# Patient Record
Sex: MALE | Race: BLACK OR AFRICAN AMERICAN | NOT HISPANIC OR LATINO | URBAN - METROPOLITAN AREA
[De-identification: names, ages, dates, MRNs, and addresses within clinical notes are randomized per-mention and may not be internally consistent; named-entity substitution may affect disease eponyms.]

---

## 2019-09-14 ENCOUNTER — INPATIENT (INPATIENT)
Facility: HOSPITAL | Age: 54
LOS: 9 days | Discharge: HOME | End: 2019-09-24
Attending: PSYCHIATRY & NEUROLOGY | Admitting: PSYCHIATRY & NEUROLOGY
Payer: MEDICAID

## 2019-09-14 VITALS
DIASTOLIC BLOOD PRESSURE: 100 MMHG | SYSTOLIC BLOOD PRESSURE: 155 MMHG | HEART RATE: 67 BPM | RESPIRATION RATE: 18 BRPM | TEMPERATURE: 98 F

## 2019-09-14 DIAGNOSIS — F29 UNSPECIFIED PSYCHOSIS NOT DUE TO A SUBSTANCE OR KNOWN PHYSIOLOGICAL CONDITION: ICD-10-CM

## 2019-09-14 LAB
ALBUMIN SERPL ELPH-MCNC: 3.8 G/DL — SIGNIFICANT CHANGE UP (ref 3.3–5)
ALP SERPL-CCNC: 68 U/L — SIGNIFICANT CHANGE UP (ref 40–120)
ALT FLD-CCNC: 21 U/L — SIGNIFICANT CHANGE UP (ref 4–41)
ANION GAP SERPL CALC-SCNC: 14 MMO/L — SIGNIFICANT CHANGE UP (ref 7–14)
APAP SERPL-MCNC: < 15 UG/ML — LOW (ref 15–25)
AST SERPL-CCNC: 50 U/L — HIGH (ref 4–40)
BASOPHILS # BLD AUTO: 0.06 K/UL — SIGNIFICANT CHANGE UP (ref 0–0.2)
BASOPHILS NFR BLD AUTO: 0.8 % — SIGNIFICANT CHANGE UP (ref 0–2)
BILIRUB SERPL-MCNC: 0.3 MG/DL — SIGNIFICANT CHANGE UP (ref 0.2–1.2)
BUN SERPL-MCNC: 13 MG/DL — SIGNIFICANT CHANGE UP (ref 7–23)
CALCIUM SERPL-MCNC: 8.8 MG/DL — SIGNIFICANT CHANGE UP (ref 8.4–10.5)
CHLORIDE SERPL-SCNC: 103 MMOL/L — SIGNIFICANT CHANGE UP (ref 98–107)
CO2 SERPL-SCNC: 23 MMOL/L — SIGNIFICANT CHANGE UP (ref 22–31)
CREAT SERPL-MCNC: 0.85 MG/DL — SIGNIFICANT CHANGE UP (ref 0.5–1.3)
EOSINOPHIL # BLD AUTO: 0.17 K/UL — SIGNIFICANT CHANGE UP (ref 0–0.5)
EOSINOPHIL NFR BLD AUTO: 2.2 % — SIGNIFICANT CHANGE UP (ref 0–6)
ETHANOL BLD-MCNC: < 10 MG/DL — SIGNIFICANT CHANGE UP
GLUCOSE SERPL-MCNC: 65 MG/DL — LOW (ref 70–99)
HCT VFR BLD CALC: 37.5 % — LOW (ref 39–50)
HGB BLD-MCNC: 12.1 G/DL — LOW (ref 13–17)
IMM GRANULOCYTES NFR BLD AUTO: 0.3 % — SIGNIFICANT CHANGE UP (ref 0–1.5)
LYMPHOCYTES # BLD AUTO: 2.26 K/UL — SIGNIFICANT CHANGE UP (ref 1–3.3)
LYMPHOCYTES # BLD AUTO: 29.8 % — SIGNIFICANT CHANGE UP (ref 13–44)
MCHC RBC-ENTMCNC: 26.8 PG — LOW (ref 27–34)
MCHC RBC-ENTMCNC: 32.3 % — SIGNIFICANT CHANGE UP (ref 32–36)
MCV RBC AUTO: 83 FL — SIGNIFICANT CHANGE UP (ref 80–100)
MONOCYTES # BLD AUTO: 0.82 K/UL — SIGNIFICANT CHANGE UP (ref 0–0.9)
MONOCYTES NFR BLD AUTO: 10.8 % — SIGNIFICANT CHANGE UP (ref 2–14)
NEUTROPHILS # BLD AUTO: 4.25 K/UL — SIGNIFICANT CHANGE UP (ref 1.8–7.4)
NEUTROPHILS NFR BLD AUTO: 56.1 % — SIGNIFICANT CHANGE UP (ref 43–77)
NRBC # FLD: 0 K/UL — SIGNIFICANT CHANGE UP (ref 0–0)
PLATELET # BLD AUTO: 232 K/UL — SIGNIFICANT CHANGE UP (ref 150–400)
PMV BLD: 9.8 FL — SIGNIFICANT CHANGE UP (ref 7–13)
POTASSIUM SERPL-MCNC: 4.2 MMOL/L — SIGNIFICANT CHANGE UP (ref 3.5–5.3)
POTASSIUM SERPL-SCNC: 4.2 MMOL/L — SIGNIFICANT CHANGE UP (ref 3.5–5.3)
PROT SERPL-MCNC: 7.2 G/DL — SIGNIFICANT CHANGE UP (ref 6–8.3)
RBC # BLD: 4.52 M/UL — SIGNIFICANT CHANGE UP (ref 4.2–5.8)
RBC # FLD: 15.3 % — HIGH (ref 10.3–14.5)
SALICYLATES SERPL-MCNC: < 5 MG/DL — LOW (ref 15–30)
SODIUM SERPL-SCNC: 140 MMOL/L — SIGNIFICANT CHANGE UP (ref 135–145)
TSH SERPL-MCNC: 2.46 UIU/ML — SIGNIFICANT CHANGE UP (ref 0.27–4.2)
WBC # BLD: 7.58 K/UL — SIGNIFICANT CHANGE UP (ref 3.8–10.5)
WBC # FLD AUTO: 7.58 K/UL — SIGNIFICANT CHANGE UP (ref 3.8–10.5)

## 2019-09-14 RX ORDER — HALOPERIDOL DECANOATE 100 MG/ML
5 INJECTION INTRAMUSCULAR ONCE
Refills: 0 | Status: COMPLETED | OUTPATIENT
Start: 2019-09-14 | End: 2019-09-14

## 2019-09-14 RX ORDER — DIPHENHYDRAMINE HCL 50 MG
50 CAPSULE ORAL ONCE
Refills: 0 | Status: COMPLETED | OUTPATIENT
Start: 2019-09-14 | End: 2019-09-15

## 2019-09-14 RX ORDER — DIPHENHYDRAMINE HCL 50 MG
50 CAPSULE ORAL EVERY 6 HOURS
Refills: 0 | Status: DISCONTINUED | OUTPATIENT
Start: 2019-09-15 | End: 2019-09-24

## 2019-09-14 RX ORDER — DIPHENHYDRAMINE HCL 50 MG
50 CAPSULE ORAL ONCE
Refills: 0 | Status: COMPLETED | OUTPATIENT
Start: 2019-09-14 | End: 2019-09-14

## 2019-09-14 RX ORDER — IBUPROFEN 200 MG
400 TABLET ORAL EVERY 8 HOURS
Refills: 0 | Status: DISCONTINUED | OUTPATIENT
Start: 2019-09-15 | End: 2019-09-24

## 2019-09-14 RX ORDER — DIPHENHYDRAMINE HCL 50 MG
50 CAPSULE ORAL ONCE
Refills: 0 | Status: DISCONTINUED | OUTPATIENT
Start: 2019-09-15 | End: 2019-09-24

## 2019-09-14 RX ORDER — RISPERIDONE 4 MG/1
1 TABLET ORAL AT BEDTIME
Refills: 0 | Status: DISCONTINUED | OUTPATIENT
Start: 2019-09-15 | End: 2019-09-19

## 2019-09-14 RX ORDER — HALOPERIDOL DECANOATE 100 MG/ML
5 INJECTION INTRAMUSCULAR ONCE
Refills: 0 | Status: DISCONTINUED | OUTPATIENT
Start: 2019-09-15 | End: 2019-09-16

## 2019-09-14 RX ORDER — HALOPERIDOL DECANOATE 100 MG/ML
5 INJECTION INTRAMUSCULAR EVERY 6 HOURS
Refills: 0 | Status: DISCONTINUED | OUTPATIENT
Start: 2019-09-15 | End: 2019-09-24

## 2019-09-14 RX ADMIN — Medication 2 MILLIGRAM(S): at 15:45

## 2019-09-14 RX ADMIN — Medication 50 MILLIGRAM(S): at 15:45

## 2019-09-14 RX ADMIN — HALOPERIDOL DECANOATE 5 MILLIGRAM(S): 100 INJECTION INTRAMUSCULAR at 15:45

## 2019-09-14 NOTE — ED BEHAVIORAL HEALTH NOTE - BEHAVIORAL HEALTH NOTE
Patient presented with acute agitation and medicated with Haldol 5mg/ Ativan 2mg/ Benadryl 50mg and now sedated as a result.  Unable to participate in psychiatric evaluation at this time and will assess when able to.
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
## 2019-09-14 NOTE — ED BEHAVIORAL HEALTH NOTE - BEHAVIORAL HEALTH NOTE
Attempted to assess patient but remains sedated and not able to participate in psychiatric interview at this time.  [employer] search is completed and reveals no results.  Psychiatry will evaluate when able, please call with questions and/or concerns.
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
## 2019-09-14 NOTE — ED BEHAVIORAL HEALTH ASSESSMENT NOTE - SUICIDE PROTECTIVE FACTORS
Identifies reasons for living/Responsibility to family and others/High spirituality/Future oriented/Engaged in work or school

## 2019-09-14 NOTE — ED PROVIDER NOTE - CLINICAL SUMMARY MEDICAL DECISION MAKING FREE TEXT BOX
53 year old male with no known past medical history presents to ED with paranoia and agitation.  Medications offered to patient for safety to staff and patient.  Labs, EKG, psych consult with Dr. Duque when patient no longer agitated.
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
## 2019-09-14 NOTE — ED BEHAVIORAL HEALTH ASSESSMENT NOTE - SUMMARY
Mr. Beckford is a 52 y/o man with no known previous history/records, denying all active symptoms. Pt presents with paranoid delusions, some grandiosity/hyperreligiosity and intermittent disorganization of thought with tangential thought process. Unable to obtain collateral, but given patient's active paranoid ideation with recent agitation, pt requires hospitalization at this time. He is suffering from unspecified psychotic disorder, r/o substance induced psychosis vs schizophrenia.    Admit to Low 4 on 9.39 status. Pt denying intent to harm himself or others at this time, no CO necessary  Initiate risperidone 1 mg qHS for active psychosis  Benadryl 50 mg/haldol 5 mg/ativan 2 mg PO/IM q6h prn for agitation  Ibuprofen prn for pain    No known medical history, but pt has mild anemia, isolated elevated AST and was moderately hypertensive in the ED. Patient may benefit from iron studies, close monitoring of BP with initiation of antihypertensive agent if BP remains elevated.
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
## 2019-09-14 NOTE — ED BEHAVIORAL HEALTH ASSESSMENT NOTE - OTHER
pt remained seated psychotic episode 911 activated by bystander psychosis ; paranoia unknown . patient is uncooperative, tangential, psychotic Not in treatment

## 2019-09-14 NOTE — ED BEHAVIORAL HEALTH ASSESSMENT NOTE - MODIFICATIONS
Pt has a tender spot in the groin area. States that it is the size of a golf ball. This has been going on, on and off for about a week. Advised the pt to be seen. Appointment has been made in Family Practice.    see bellow Pt is seen and evaluated
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
## 2019-09-15 PROCEDURE — 99222 1ST HOSP IP/OBS MODERATE 55: CPT

## 2019-09-15 RX ORDER — DIPHENHYDRAMINE HCL 50 MG
50 CAPSULE ORAL ONCE
Refills: 0 | Status: DISCONTINUED | OUTPATIENT
Start: 2019-09-15 | End: 2019-09-24

## 2019-09-15 RX ORDER — HALOPERIDOL DECANOATE 100 MG/ML
5 INJECTION INTRAMUSCULAR ONCE
Refills: 0 | Status: DISCONTINUED | OUTPATIENT
Start: 2019-09-15 | End: 2019-09-24

## 2019-09-15 RX ADMIN — Medication 2 MILLIGRAM(S): at 00:08

## 2019-09-15 RX ADMIN — Medication 50 MILLIGRAM(S): at 00:08

## 2019-09-15 RX ADMIN — HALOPERIDOL DECANOATE 5 MILLIGRAM(S): 100 INJECTION INTRAMUSCULAR at 00:08

## 2019-09-15 NOTE — ED ADULT NURSE REASSESSMENT NOTE - NS ED NURSE REASSESS COMMENT FT1
Pt is agitated and threatening aggression towards staff.  Pt is disorganized and confused.  Pt medicated per EMAR.  Will continue to monitor for safety and therapeutic effect.

## 2019-09-15 NOTE — ED ADULT NURSE NOTE - NSIMPLEMENTINTERV_GEN_ALL_ED
Implemented All Universal Safety Interventions:  [place] to call system. Call bell, personal items and telephone within reach. Instruct patient to call for assistance. Room bathroom lighting operational. Non-slip footwear when patient is off stretcher. Physically safe environment: no spills, clutter or unnecessary equipment. Stretcher in lowest position, wheels locked, appropriate side rails in place.

## 2019-09-16 PROCEDURE — 99232 SBSQ HOSP IP/OBS MODERATE 35: CPT

## 2019-09-16 RX ORDER — CHLORPROMAZINE HCL 10 MG
100 TABLET ORAL ONCE
Refills: 0 | Status: DISCONTINUED | OUTPATIENT
Start: 2019-09-16 | End: 2019-09-24

## 2019-09-16 RX ORDER — DIPHENHYDRAMINE HCL 50 MG
50 CAPSULE ORAL ONCE
Refills: 0 | Status: DISCONTINUED | OUTPATIENT
Start: 2019-09-16 | End: 2019-09-24

## 2019-09-17 PROCEDURE — 99232 SBSQ HOSP IP/OBS MODERATE 35: CPT

## 2019-09-17 RX ADMIN — HALOPERIDOL DECANOATE 5 MILLIGRAM(S): 100 INJECTION INTRAMUSCULAR at 20:12

## 2019-09-17 RX ADMIN — Medication 2 MILLIGRAM(S): at 20:12

## 2019-09-17 RX ADMIN — RISPERIDONE 1 MILLIGRAM(S): 4 TABLET ORAL at 20:11

## 2019-09-17 RX ADMIN — Medication 50 MILLIGRAM(S): at 20:12

## 2019-09-18 PROCEDURE — 99231 SBSQ HOSP IP/OBS SF/LOW 25: CPT

## 2019-09-18 RX ADMIN — Medication 2 MILLIGRAM(S): at 21:03

## 2019-09-18 RX ADMIN — HALOPERIDOL DECANOATE 5 MILLIGRAM(S): 100 INJECTION INTRAMUSCULAR at 21:03

## 2019-09-18 RX ADMIN — Medication 50 MILLIGRAM(S): at 21:03

## 2019-09-18 RX ADMIN — RISPERIDONE 1 MILLIGRAM(S): 4 TABLET ORAL at 21:02

## 2019-09-19 PROCEDURE — 99232 SBSQ HOSP IP/OBS MODERATE 35: CPT

## 2019-09-19 RX ORDER — RISPERIDONE 4 MG/1
2 TABLET ORAL AT BEDTIME
Refills: 0 | Status: DISCONTINUED | OUTPATIENT
Start: 2019-09-19 | End: 2019-09-24

## 2019-09-19 RX ADMIN — RISPERIDONE 2 MILLIGRAM(S): 4 TABLET ORAL at 20:06

## 2019-09-19 RX ADMIN — HALOPERIDOL DECANOATE 5 MILLIGRAM(S): 100 INJECTION INTRAMUSCULAR at 20:06

## 2019-09-19 RX ADMIN — Medication 400 MILLIGRAM(S): at 18:27

## 2019-09-19 RX ADMIN — Medication 2 MILLIGRAM(S): at 20:06

## 2019-09-19 RX ADMIN — Medication 50 MILLIGRAM(S): at 20:07

## 2019-09-20 PROCEDURE — 99232 SBSQ HOSP IP/OBS MODERATE 35: CPT

## 2019-09-20 RX ADMIN — RISPERIDONE 2 MILLIGRAM(S): 4 TABLET ORAL at 20:08

## 2019-09-20 RX ADMIN — Medication 400 MILLIGRAM(S): at 20:28

## 2019-09-20 RX ADMIN — Medication 50 MILLIGRAM(S): at 19:26

## 2019-09-20 RX ADMIN — Medication 400 MILLIGRAM(S): at 19:27

## 2019-09-20 RX ADMIN — Medication 2 MILLIGRAM(S): at 19:27

## 2019-09-20 RX ADMIN — HALOPERIDOL DECANOATE 5 MILLIGRAM(S): 100 INJECTION INTRAMUSCULAR at 19:26

## 2019-09-21 RX ADMIN — Medication 2 MILLIGRAM(S): at 20:06

## 2019-09-21 RX ADMIN — Medication 50 MILLIGRAM(S): at 20:05

## 2019-09-21 RX ADMIN — HALOPERIDOL DECANOATE 5 MILLIGRAM(S): 100 INJECTION INTRAMUSCULAR at 20:06

## 2019-09-21 RX ADMIN — RISPERIDONE 2 MILLIGRAM(S): 4 TABLET ORAL at 20:05

## 2019-09-22 RX ADMIN — Medication 50 MILLIGRAM(S): at 19:47

## 2019-09-22 RX ADMIN — Medication 400 MILLIGRAM(S): at 09:18

## 2019-09-22 RX ADMIN — Medication 400 MILLIGRAM(S): at 19:46

## 2019-09-22 RX ADMIN — Medication 2 MILLIGRAM(S): at 19:47

## 2019-09-22 RX ADMIN — HALOPERIDOL DECANOATE 5 MILLIGRAM(S): 100 INJECTION INTRAMUSCULAR at 19:47

## 2019-09-22 RX ADMIN — RISPERIDONE 2 MILLIGRAM(S): 4 TABLET ORAL at 20:15

## 2019-09-22 RX ADMIN — Medication 400 MILLIGRAM(S): at 21:52

## 2019-09-23 PROCEDURE — 99231 SBSQ HOSP IP/OBS SF/LOW 25: CPT

## 2019-09-23 RX ORDER — RISPERIDONE 4 MG/1
1 TABLET ORAL
Qty: 14 | Refills: 0
Start: 2019-09-23 | End: 2019-10-06

## 2019-09-23 RX ADMIN — Medication 400 MILLIGRAM(S): at 19:40

## 2019-09-23 RX ADMIN — Medication 400 MILLIGRAM(S): at 20:35

## 2019-09-23 RX ADMIN — Medication 1 MILLIGRAM(S): at 19:40

## 2019-09-23 RX ADMIN — Medication 50 MILLIGRAM(S): at 19:40

## 2019-09-23 RX ADMIN — RISPERIDONE 2 MILLIGRAM(S): 4 TABLET ORAL at 22:14

## 2019-09-23 RX ADMIN — HALOPERIDOL DECANOATE 5 MILLIGRAM(S): 100 INJECTION INTRAMUSCULAR at 19:40

## 2019-09-23 RX ADMIN — Medication 400 MILLIGRAM(S): at 11:57

## 2019-09-24 VITALS — TEMPERATURE: 98 F

## 2019-09-24 PROCEDURE — 99238 HOSP IP/OBS DSCHRG MGMT 30/<: CPT

## 2019-09-24 RX ADMIN — Medication 400 MILLIGRAM(S): at 08:24

## 2019-10-01 ENCOUNTER — OUTPATIENT (OUTPATIENT)
Dept: OUTPATIENT SERVICES | Facility: HOSPITAL | Age: 54
LOS: 1 days | End: 2019-10-01
Payer: MEDICAID

## 2019-10-01 PROCEDURE — G9001: CPT

## 2019-10-10 DIAGNOSIS — Z71.89 OTHER SPECIFIED COUNSELING: ICD-10-CM

## 2019-11-01 ENCOUNTER — OUTPATIENT (OUTPATIENT)
Dept: OUTPATIENT SERVICES | Facility: HOSPITAL | Age: 54
LOS: 1 days | End: 2019-11-01

## 2019-12-04 DIAGNOSIS — Z71.89 OTHER SPECIFIED COUNSELING: ICD-10-CM
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
## 2022-10-29 ENCOUNTER — EMERGENCY (EMERGENCY)
Facility: HOSPITAL | Age: 57
LOS: 1 days | Discharge: PSYCHIATRIC OR SUBSTANCE USE FACILITY | End: 2022-10-29
Attending: EMERGENCY MEDICINE
Payer: MEDICAID

## 2022-10-29 VITALS
OXYGEN SATURATION: 99 % | SYSTOLIC BLOOD PRESSURE: 129 MMHG | HEART RATE: 70 BPM | TEMPERATURE: 98 F | DIASTOLIC BLOOD PRESSURE: 70 MMHG | RESPIRATION RATE: 18 BRPM

## 2022-10-29 DIAGNOSIS — F29 UNSPECIFIED PSYCHOSIS NOT DUE TO A SUBSTANCE OR KNOWN PHYSIOLOGICAL CONDITION: ICD-10-CM

## 2022-10-29 LAB
ALBUMIN SERPL ELPH-MCNC: 3.7 G/DL — SIGNIFICANT CHANGE UP (ref 3.3–5)
ALP SERPL-CCNC: 82 U/L — SIGNIFICANT CHANGE UP (ref 40–120)
ALT FLD-CCNC: 17 U/L — SIGNIFICANT CHANGE UP (ref 10–45)
ANION GAP SERPL CALC-SCNC: 10 MMOL/L — SIGNIFICANT CHANGE UP (ref 5–17)
APAP SERPL-MCNC: <15 UG/ML — SIGNIFICANT CHANGE UP (ref 10–30)
AST SERPL-CCNC: 27 U/L — SIGNIFICANT CHANGE UP (ref 10–40)
BASOPHILS # BLD AUTO: 0.05 K/UL — SIGNIFICANT CHANGE UP (ref 0–0.2)
BASOPHILS NFR BLD AUTO: 0.8 % — SIGNIFICANT CHANGE UP (ref 0–2)
BILIRUB SERPL-MCNC: 0.1 MG/DL — LOW (ref 0.2–1.2)
BUN SERPL-MCNC: 12 MG/DL — SIGNIFICANT CHANGE UP (ref 7–23)
CALCIUM SERPL-MCNC: 8.7 MG/DL — SIGNIFICANT CHANGE UP (ref 8.4–10.5)
CHLORIDE SERPL-SCNC: 108 MMOL/L — SIGNIFICANT CHANGE UP (ref 96–108)
CO2 SERPL-SCNC: 24 MMOL/L — SIGNIFICANT CHANGE UP (ref 22–31)
CREAT SERPL-MCNC: 0.68 MG/DL — SIGNIFICANT CHANGE UP (ref 0.5–1.3)
EGFR: 110 ML/MIN/1.73M2 — SIGNIFICANT CHANGE UP
EOSINOPHIL # BLD AUTO: 0.17 K/UL — SIGNIFICANT CHANGE UP (ref 0–0.5)
EOSINOPHIL NFR BLD AUTO: 2.9 % — SIGNIFICANT CHANGE UP (ref 0–6)
ETHANOL SERPL-MCNC: <10 MG/DL — SIGNIFICANT CHANGE UP (ref 0–10)
GLUCOSE SERPL-MCNC: 108 MG/DL — HIGH (ref 70–99)
HCT VFR BLD CALC: 34.4 % — LOW (ref 39–50)
HGB BLD-MCNC: 11.4 G/DL — LOW (ref 13–17)
IMM GRANULOCYTES NFR BLD AUTO: 0.3 % — SIGNIFICANT CHANGE UP (ref 0–0.9)
LYMPHOCYTES # BLD AUTO: 1.26 K/UL — SIGNIFICANT CHANGE UP (ref 1–3.3)
LYMPHOCYTES # BLD AUTO: 21.1 % — SIGNIFICANT CHANGE UP (ref 13–44)
MCHC RBC-ENTMCNC: 27.8 PG — SIGNIFICANT CHANGE UP (ref 27–34)
MCHC RBC-ENTMCNC: 33.1 GM/DL — SIGNIFICANT CHANGE UP (ref 32–36)
MCV RBC AUTO: 83.9 FL — SIGNIFICANT CHANGE UP (ref 80–100)
MONOCYTES # BLD AUTO: 0.63 K/UL — SIGNIFICANT CHANGE UP (ref 0–0.9)
MONOCYTES NFR BLD AUTO: 10.6 % — SIGNIFICANT CHANGE UP (ref 2–14)
NEUTROPHILS # BLD AUTO: 3.83 K/UL — SIGNIFICANT CHANGE UP (ref 1.8–7.4)
NEUTROPHILS NFR BLD AUTO: 64.3 % — SIGNIFICANT CHANGE UP (ref 43–77)
NRBC # BLD: 0 /100 WBCS — SIGNIFICANT CHANGE UP (ref 0–0)
PLATELET # BLD AUTO: 253 K/UL — SIGNIFICANT CHANGE UP (ref 150–400)
POTASSIUM SERPL-MCNC: 3.5 MMOL/L — SIGNIFICANT CHANGE UP (ref 3.5–5.3)
POTASSIUM SERPL-SCNC: 3.5 MMOL/L — SIGNIFICANT CHANGE UP (ref 3.5–5.3)
PROT SERPL-MCNC: 6.7 G/DL — SIGNIFICANT CHANGE UP (ref 6–8.3)
RBC # BLD: 4.1 M/UL — LOW (ref 4.2–5.8)
RBC # FLD: 14.8 % — HIGH (ref 10.3–14.5)
SALICYLATES SERPL-MCNC: <2 MG/DL — LOW (ref 15–30)
SARS-COV-2 RNA SPEC QL NAA+PROBE: DETECTED
SODIUM SERPL-SCNC: 142 MMOL/L — SIGNIFICANT CHANGE UP (ref 135–145)
WBC # BLD: 5.96 K/UL — SIGNIFICANT CHANGE UP (ref 3.8–10.5)
WBC # FLD AUTO: 5.96 K/UL — SIGNIFICANT CHANGE UP (ref 3.8–10.5)

## 2022-10-29 PROCEDURE — 90792 PSYCH DIAG EVAL W/MED SRVCS: CPT | Mod: 95

## 2022-10-29 PROCEDURE — 70450 CT HEAD/BRAIN W/O DYE: CPT | Mod: 26,MA

## 2022-10-29 PROCEDURE — 99285 EMERGENCY DEPT VISIT HI MDM: CPT

## 2022-10-29 RX ORDER — HALOPERIDOL DECANOATE 100 MG/ML
5 INJECTION INTRAMUSCULAR ONCE
Refills: 0 | Status: COMPLETED | OUTPATIENT
Start: 2022-10-29 | End: 2022-10-29

## 2022-10-29 RX ADMIN — Medication 2 MILLIGRAM(S): at 16:09

## 2022-10-29 RX ADMIN — HALOPERIDOL DECANOATE 5 MILLIGRAM(S): 100 INJECTION INTRAMUSCULAR at 16:10

## 2022-10-29 NOTE — ED BEHAVIORAL HEALTH ASSESSMENT NOTE - RISK ASSESSMENT
06-Oct-2018 02:50 Patient is at higher risk fo harming self and others due to disorganized behavior, violent and aggressive behavior, inability to engage in clinical interview or safety plan. Risk is mitigated by presence in a safe environment with access to clinical interventions. Unable to determine Suicide Risk
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
## 2022-10-29 NOTE — ED PROVIDER NOTE - CARE PLAN
1 Principal Discharge DX:	Psychosis, unspecified psychosis type  Secondary Diagnosis:	Psychosis, unspecified psychosis type
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
## 2022-10-29 NOTE — ED BEHAVIORAL HEALTH ASSESSMENT NOTE - SUBSTANCE ISSUES AND PLAN (INCLUDE STANDING AND PRN MEDICATION)
Unknown substance use history, recommend [facility] protocol; attempt to collect urine tox
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
## 2022-10-29 NOTE — ED PROVIDER NOTE - ATTENDING CONTRIBUTION TO CARE
Attending MD Netta Garrido:  I personally have seen and examined this patient.  Resident note reviewed and agree on plan of care and except where noted.  See HPI, PE, and MDM for details.

## 2022-10-29 NOTE — ED BEHAVIORAL HEALTH ASSESSMENT NOTE - OTHER
[facility], [facility] reportedly undomiciled unable to assess police/EMS

## 2022-10-29 NOTE — ED ADULT NURSE NOTE - ED STAT RN HANDOFF DETAILS 4
Blood work is improving, heart failure numbers are improving, please inform patient. She should follow up with the cardiologist and Dr Missy Parsons as recommended.  Thanks
Pt lab results are in. Pls review abnormal levels. LOV. 12/31/19  NOV 1/27/20  pls advise on results. Routing to Dr. Obie Hogan, pls advise. ;
Relayed info to pt as stated below. Pt verbalized understanding.
report received from Crista CHUNG
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
## 2022-10-29 NOTE — ED PROVIDER NOTE - PHYSICAL EXAMINATION
Gabrielle Wong MD  GENERAL: Patient asleep after meds, NAD. disheveled  HEENT: NC/AT, Moist mucous membranes, EOMI.  LUNGS: CTAB, no wheezes or crackles.   CARDIAC: RRR, no m/r/g.    ABDOMEN: Soft, NT, ND, No rebound, guarding. No CVA tenderness.   EXT: No edema. No calf tenderness  MSK: No pain with movement, no deformities.  NEURO: Moving all extremities.  SKIN: Warm and dry. No rash. Gabrielle Wong MD  GENERAL: Patient asleep after meds, NAD. disheveled  HEENT: NC/AT, Moist mucous membranes, EOMI.  LUNGS: CTAB, no wheezes or crackles.   CARDIAC: RRR, no m/r/g.    ABDOMEN: Soft, NT, ND, No rebound, guarding. No CVA tenderness.   EXT: No edema. No calf tenderness  MSK: No pain with movement, no deformities.  NEURO: Moving all extremities.  SKIN: Warm and dry. No rash.  Attending Netta Garrido: Gen: nad: heent: atrauamtic, mmm, neck: nttp full rom, cv:rrr. lungs ;ctab, abd; :soft, onntender, ext: wwp, neuro:awake anot following commands. refusing to communicate

## 2022-10-29 NOTE — ED PROVIDER NOTE - PROGRESS NOTE DETAILS
psych consulted psych consulted but will call back as currently busy pt arousable. not answering questions.  psych consulted Attending Netta Garrido: d/w tele psych unable to clear at this time. pt not cooperating with camera. will try to re eval Migue Stanley PGY-3 patient seen by psych in ed, too drowsy to answer questions, will call back when more awake Migue Stanley PGY-3 patient being uncooperative with psych, not answering questions. will 2PC Migue Stanley PGY-3 patient being uncooperative with psych, not answering questions. concern for psychosis, will 2PC Patient signed out to me by the prior attending.  The patient's disposition was discussed with the treating team and agreed upon.  Ronaldo Hinton M.D. (attending) patient pending am day psychiatry. Patient has spoken to the RN team Wale Trejo patient is stating that the emergency department team is "evil everyone here is evil and you work for evil and you're treating me as if I'm a dog." Attending Netta Garrido: pt endorsed to me awaiting psych placement. informedb by nurse pt rolled out of bed. awake and alert. moving all extreimties. pt denies any pain. not on blood thinners. will continue to monitor. Clint Sullivan, PGY-3- patient received at sign out. Pending psych placement. Pt to be transferred to [facility]

## 2022-10-30 LAB
APPEARANCE UR: CLEAR — SIGNIFICANT CHANGE UP
BILIRUB UR-MCNC: NEGATIVE — SIGNIFICANT CHANGE UP
COLOR SPEC: SIGNIFICANT CHANGE UP
DIFF PNL FLD: NEGATIVE — SIGNIFICANT CHANGE UP
GLUCOSE UR QL: NEGATIVE — SIGNIFICANT CHANGE UP
KETONES UR-MCNC: NEGATIVE — SIGNIFICANT CHANGE UP
LEUKOCYTE ESTERASE UR-ACNC: NEGATIVE — SIGNIFICANT CHANGE UP
NITRITE UR-MCNC: NEGATIVE — SIGNIFICANT CHANGE UP
PH UR: 7 — SIGNIFICANT CHANGE UP (ref 5–8)
PROT UR-MCNC: SIGNIFICANT CHANGE UP
SP GR SPEC: 1.02 — SIGNIFICANT CHANGE UP (ref 1.01–1.02)
UROBILINOGEN FLD QL: NEGATIVE — SIGNIFICANT CHANGE UP

## 2022-10-30 RX ORDER — OLANZAPINE 15 MG/1
2.5 TABLET, FILM COATED ORAL ONCE
Refills: 0 | Status: COMPLETED | OUTPATIENT
Start: 2022-10-30 | End: 2022-10-30

## 2022-10-30 NOTE — PROGRESS NOTE BEHAVIORAL HEALTH - NSBHCONSULTRECOMMENDOTHER_PSY_A_CORE FT
1) consider initiating zyprexa 2.5 mg po BID for psychosis/mood stability  2) continue haldol 5 mg and ativan 2 mg IV/IM Q6hr PRN for acute agitation, f/u QTc < 500  3) 2PC paperwork in chart, SW aware to facilitate psychiatric transfer
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
## 2022-10-30 NOTE — PROGRESS NOTE BEHAVIORAL HEALTH - NSBHCHARTREVIEWVS_PSY_A_CORE FT
T(C): 36.5 (10-30-22 @ 07:15), Max: 36.6 (10-29-22 @ 19:11)  HR: 85 (10-30-22 @ 07:15) (69 - 85)  BP: 136/76 (10-30-22 @ 07:15) (118/75 - 136/76)  RR: 17 (10-30-22 @ 07:15) (17 - 18)  SpO2: 99% (10-30-22 @ 07:15) (99% - 100%)  Wt(kg): --

## 2022-10-30 NOTE — ED ADULT NURSE REASSESSMENT NOTE - NS ED NURSE REASSESS COMMENT FT1
psych at bedside for eval. psych at bedside for eval. pt asleep, snoring, not waking up for eval. psych asked to be paged once pt wakes up. MD Hinton aware.
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
## 2022-10-30 NOTE — ED ADULT NURSE REASSESSMENT NOTE - NS ED NURSE REASSESS COMMENT FT1
RN spoke with social work regarding bed placement for pt. as per social work no covid positive psych beds are available at this time. Rn attempted to get vitals on pt. pt became aggressive and grabbed BP cuff out of RN's hands and waved hand in RN's face. PT then preceded to throw sandwich and juices proved by RN across the room. EM MD team notified.

## 2022-10-30 NOTE — ED ADULT NURSE REASSESSMENT NOTE - NS ED NURSE REASSESS COMMENT FT1
pt woken easily. pt became immediately agitated and began yelling at RN. pt consented to having vital signes taken. pt given food as requested and went to sleep after vitals were taken. received report from JULIET Baltazar. pt woken easily. pt became immediately agitated and began yelling at RN. pt consented to having vital signes taken. vital signs stable. pt given food as requested and went to sleep after vitals were taken. pt awaiting psych consult.

## 2022-10-30 NOTE — PROGRESS NOTE BEHAVIORAL HEALTH - NSBHFUPINTERVALHXFT_PSY_A_CORE
pt seen, with blanket over head, then when prompted to engage, pt quickly jumped up, grabbed his food and drink and began eating. pt appeared agitated, refusing to answer questions, was not talking. pt when asked if he had thoughts to hurting himself, he shook his head. when asked if he had thoughts of hurting others, he nodded his head, but would not verbalize his thoughts. Staff did indicate pt was verbally agitated when he came to the ER. pt remains on 1:1, is danger to self and others based on history that brought pt into the ER.

## 2022-10-30 NOTE — PROGRESS NOTE BEHAVIORAL HEALTH - NSBHCHARTREVIEWLAB_PSY_A_CORE FT
11.4   5.96  )-----------( 253      ( 29 Oct 2022 16:48 )             34.4   10-29    142  |  108  |  12  ----------------------------<  108<H>  3.5   |  24  |  0.68    Ca    8.7      29 Oct 2022 16:48    TPro  6.7  /  Alb  3.7  /  TBili  0.1<L>  /  DBili  x   /  AST  27  /  ALT  17  /  AlkPhos  82  10-29

## 2022-10-30 NOTE — ED ADULT NURSE REASSESSMENT NOTE - NS ED NURSE REASSESS COMMENT FT1
Report received from JULIET Echevarria.  As per RN pt refusing care, getting upset when trying to get VS. MD aware of the situation. Pt sleeping at this time. Constant observation maintained, environment check for safety.

## 2022-10-30 NOTE — ED ADULT NURSE REASSESSMENT NOTE - NS ED NURSE REASSESS COMMENT FT1
as per MD, zyprexa should be given if pt becomes agitated and does not need to be given while pt is sleeping.
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
## 2022-10-31 ENCOUNTER — INPATIENT (INPATIENT)
Facility: HOSPITAL | Age: 57
LOS: 2 days | Discharge: HOME | End: 2022-11-03
Attending: PSYCHIATRY & NEUROLOGY | Admitting: PSYCHIATRY & NEUROLOGY

## 2022-10-31 VITALS
OXYGEN SATURATION: 100 % | SYSTOLIC BLOOD PRESSURE: 111 MMHG | TEMPERATURE: 98 F | HEART RATE: 82 BPM | DIASTOLIC BLOOD PRESSURE: 57 MMHG | RESPIRATION RATE: 16 BRPM

## 2022-10-31 VITALS — HEIGHT: 76 IN | TEMPERATURE: 97 F | WEIGHT: 166.89 LBS

## 2022-10-31 DIAGNOSIS — F10.10 ALCOHOL ABUSE, UNCOMPLICATED: ICD-10-CM

## 2022-10-31 DIAGNOSIS — Z59.00 HOMELESSNESS UNSPECIFIED: ICD-10-CM

## 2022-10-31 DIAGNOSIS — F14.10 COCAINE ABUSE, UNCOMPLICATED: ICD-10-CM

## 2022-10-31 DIAGNOSIS — F29 UNSPECIFIED PSYCHOSIS NOT DUE TO A SUBSTANCE OR KNOWN PHYSIOLOGICAL CONDITION: ICD-10-CM

## 2022-10-31 DIAGNOSIS — F60.2 ANTISOCIAL PERSONALITY DISORDER: ICD-10-CM

## 2022-10-31 PROCEDURE — 99285 EMERGENCY DEPT VISIT HI MDM: CPT | Mod: 25

## 2022-10-31 PROCEDURE — 93005 ELECTROCARDIOGRAM TRACING: CPT

## 2022-10-31 PROCEDURE — 81003 URINALYSIS AUTO W/O SCOPE: CPT

## 2022-10-31 PROCEDURE — 36415 COLL VENOUS BLD VENIPUNCTURE: CPT

## 2022-10-31 PROCEDURE — 70450 CT HEAD/BRAIN W/O DYE: CPT | Mod: MA

## 2022-10-31 PROCEDURE — 85025 COMPLETE CBC W/AUTO DIFF WBC: CPT

## 2022-10-31 PROCEDURE — 87635 SARS-COV-2 COVID-19 AMP PRB: CPT

## 2022-10-31 PROCEDURE — 80307 DRUG TEST PRSMV CHEM ANLYZR: CPT

## 2022-10-31 PROCEDURE — 96372 THER/PROPH/DIAG INJ SC/IM: CPT

## 2022-10-31 PROCEDURE — 99222 1ST HOSP IP/OBS MODERATE 55: CPT | Mod: GC

## 2022-10-31 PROCEDURE — 80053 COMPREHEN METABOLIC PANEL: CPT

## 2022-10-31 RX ORDER — LANOLIN ALCOHOL/MO/W.PET/CERES
5 CREAM (GRAM) TOPICAL AT BEDTIME
Refills: 0 | Status: DISCONTINUED | OUTPATIENT
Start: 2022-10-31 | End: 2022-11-03

## 2022-10-31 RX ORDER — HALOPERIDOL DECANOATE 100 MG/ML
5 INJECTION INTRAMUSCULAR EVERY 4 HOURS
Refills: 0 | Status: DISCONTINUED | OUTPATIENT
Start: 2022-10-31 | End: 2022-11-03

## 2022-10-31 RX ORDER — HALOPERIDOL DECANOATE 100 MG/ML
5 INJECTION INTRAMUSCULAR EVERY 4 HOURS
Refills: 0 | Status: DISCONTINUED | OUTPATIENT
Start: 2022-10-31 | End: 2022-10-31

## 2022-10-31 RX ORDER — DIPHENHYDRAMINE HCL 50 MG
50 CAPSULE ORAL EVERY 4 HOURS
Refills: 0 | Status: DISCONTINUED | OUTPATIENT
Start: 2022-10-31 | End: 2022-11-03

## 2022-10-31 RX ORDER — HALOPERIDOL DECANOATE 100 MG/ML
7.5 INJECTION INTRAMUSCULAR ONCE
Refills: 0 | Status: DISCONTINUED | OUTPATIENT
Start: 2022-10-31 | End: 2022-11-03

## 2022-10-31 RX ORDER — OLANZAPINE 15 MG/1
10 TABLET, FILM COATED ORAL AT BEDTIME
Refills: 0 | Status: DISCONTINUED | OUTPATIENT
Start: 2022-10-31 | End: 2022-11-01

## 2022-10-31 RX ORDER — DIPHENHYDRAMINE HCL 50 MG
25 CAPSULE ORAL ONCE
Refills: 0 | Status: DISCONTINUED | OUTPATIENT
Start: 2022-10-31 | End: 2022-11-03

## 2022-10-31 RX ADMIN — OLANZAPINE 2.5 MILLIGRAM(S): 15 TABLET, FILM COATED ORAL at 09:09

## 2022-10-31 SDOH — ECONOMIC STABILITY - HOUSING INSECURITY: HOMELESSNESS UNSPECIFIED: Z59.00
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
## 2022-10-31 NOTE — BH INPATIENT PSYCHIATRY ASSESSMENT NOTE - CURRENT MEDICATION
MEDICATIONS  (STANDING):    MEDICATIONS  (PRN):   MEDICATIONS  (STANDING):  multivitamin 1 Tablet(s) Oral daily  OLANZapine Disintegrating Tablet 10 milliGRAM(s) Oral at bedtime    MEDICATIONS  (PRN):  diphenhydrAMINE 50 milliGRAM(s) Oral every 4 hours PRN agitation  diphenhydrAMINE Injectable 25 milliGRAM(s) IntraMuscular once PRN severe agitation  haloperidol     Tablet 5 milliGRAM(s) Oral every 4 hours PRN agitation  haloperidol    Injectable 7.5 milliGRAM(s) IntraMuscular once PRN severe agitation  LORazepam     Tablet 2 milliGRAM(s) Oral every 4 hours PRN agitation  LORazepam   Injectable 3 milliGRAM(s) IntraMuscular once PRN severe agitation  melatonin. 5 milliGRAM(s) Oral at bedtime PRN Insomnia

## 2022-10-31 NOTE — BH INPATIENT PSYCHIATRY ASSESSMENT NOTE - MSE UNSTRUCTURED FT
The patient appears older than stated age, poor hygiene and dressed inappropriately wrapped in Caodaism fashion in hospital gowns.  The patient was labile, easily irritable, cooperative with the interview and poor eye contact. Oddly related.  No psychomotor agitation or retardation noted, no abnormal movements.  Steady gait observed.  The patient's speech was fluent, normal in tone, rate, and loud volume.  The patient's mood is "good."  Affect is irritable, labile and not appropriate.  Thought process is mainly linear but can be disorganized at times, tangential. Thought content notable for paranoid delusional thought of persecution. No SI, HI. Unable to fully assess for hallucinations. Cognition grossly intact. Insight is poor.  Judgment is poor.  Impulse control has been fair on the unit.

## 2022-10-31 NOTE — BH INPATIENT PSYCHIATRY ASSESSMENT NOTE - NSBHATTESTCOMMENTATTENDFT_PSY_A_CORE
Patient seen and chart reviewed  Initially very argumentative and uncooperative  Later able to describe the circumstances of his being BIB police after writing Munir at [employer]  Has been living on the streets and refuses Shelter  Uses cannabis and cocaine  Long hx of psychosis as per [facility]  Denies medical history  Denies allergies  Denies having Psychiatric illness  On initial MSE today the patient is dressed in hospital gowns and makes poor eye contact.   Speech is somewhat rapid and pressured  Thought process with significant disorder of thought process including thought disorganization.   Thought content with grandiose and paranoid delusions.   Patient requires acute inpatient care for treatment of psychosis.   Patient transferred from [facility] CL on a 927 involuntary status.    Patient does not require constant observation at this time and will follow with routine checks.   Patient started at [facility] on Olanzapine and will continue with 10 mg HS and will follow for response.

## 2022-10-31 NOTE — BH INPATIENT PSYCHIATRY ASSESSMENT NOTE - NSBHCHARTREVIEWVS_PSY_A_CORE FT
Vital Signs Last 24 Hrs  T(C): 36.1 (10-31-22 @ 16:43), Max: 36.1 (10-31-22 @ 16:43)  T(F): 97 (10-31-22 @ 16:43), Max: 97 (10-31-22 @ 16:43)  HR: --  BP: --  BP(mean): --  RR: --  SpO2: --    Orthostatic VS  10-31-22 @ 16:43  Lying BP: --/-- HR: --  Sitting BP: 125/84 HR: 80  Standing BP: 120/80 HR: 80  Site: --  Mode: --

## 2022-10-31 NOTE — ED BEHAVIORAL HEALTH NOTE - BEHAVIORAL HEALTH NOTE
=============  Re-assessment Note  =============  SOURCE:  RN and [employer] ED documentation.  BEHAVIOR: RN stated that the patient was originally agitated, stated patient was aggressive towards staff, though was able to respond to verbal re-direction without requiring PRN medication. RN stated patient was able to eat dinner prior to going to sleep, denies patient is actively expressing SI/HI/AVH.   TREATMENT:  Per chart, patient did not require PRN medications.   VISITORS: None

## 2022-10-31 NOTE — ED ADULT NURSE REASSESSMENT NOTE - NS ED NURSE REASSESS COMMENT FT1
Pt awake, calm, cooperative. Pt appears comfortable. Pt denies any needs at this time. Pt placed in position of comfort. Pt was provided with  food and beverage. Constant observation remained. Bed in lowest position, wheels locked, appropriate side rails raised.

## 2022-10-31 NOTE — ED ADULT NURSE REASSESSMENT NOTE - NS ED NURSE REASSESS COMMENT FT1
Patient remains stable, awake and alert, calm and cooperative, still awaiting Psyche placement. Patient is in Isolation for Covid. Patient re wanded by security. VS WDL. Continued on 1:1 observation for safety.

## 2022-10-31 NOTE — BH INPATIENT PSYCHIATRY ASSESSMENT NOTE - RISK ASSESSMENT
risk factors: male, active psychosis, unhoused, non adherent to treatment, lack of support system  protective factors: denies HI, SI.   At this time, pt with low risk of harm to self or others. No elevated risk of suicide.
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
## 2022-10-31 NOTE — ED ADULT NURSE REASSESSMENT NOTE - NS ED NURSE REASSESS COMMENT FT1
Patient received from JULIET miller, patient resting/sleeping but easily arousable, been here for more than a day for aggressive behavior, on isolation and awaiting Psyche placement. Patient calm and coooperative, talking saying"I eat everything" when offered breakfast, and saying" God bless you brother". VS WDL. Meds given as ordered and tolerated well.

## 2022-10-31 NOTE — BH INPATIENT PSYCHIATRY ASSESSMENT NOTE - NSICDXBHSECONDARYDX_PSY_ALL_CORE
Cocaine abuse   F14.10  Alcohol abuse   F10.10  Homelessness   Z59.00  Antisocial personality disorder   F60.2

## 2022-11-01 PROCEDURE — 99232 SBSQ HOSP IP/OBS MODERATE 35: CPT | Mod: GC

## 2022-11-01 RX ORDER — RISPERIDONE 4 MG/1
2 TABLET ORAL AT BEDTIME
Refills: 0 | Status: DISCONTINUED | OUTPATIENT
Start: 2022-11-01 | End: 2022-11-03

## 2022-11-01 RX ORDER — IBUPROFEN 200 MG
600 TABLET ORAL ONCE
Refills: 0 | Status: COMPLETED | OUTPATIENT
Start: 2022-11-01 | End: 2022-11-01

## 2022-11-01 RX ADMIN — Medication 600 MILLIGRAM(S): at 22:35

## 2022-11-01 RX ADMIN — Medication 600 MILLIGRAM(S): at 21:35

## 2022-11-01 RX ADMIN — Medication 1 TABLET(S): at 08:54
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
## 2022-11-01 NOTE — PSYCHIATRIC REHAB INITIAL EVALUATION - NSBHPRRECOMMEND_PSY_ALL_CORE
Writer met with patient to orient patient to the unit and introduce psychiatric rehabilitation staff and functions. Throughout the session, patient communicated non-verbally (i.e. body language and writing down). Patient was noted to write “stop the crap. Be who you are. I love  Culture!” When queried about what he means by “stop the crap,” patient continued to write “job formalities and protocol.” Patient did not specify further. Patient was unable to engage in meaningful conversation as patient continued to make bodily gestures that writer could not decipher. Patient presented with poor insight to his symptoms. Patient denied SI, HI, AH, and VH as evidenced by patient shaking his head. Psychiatric rehabilitation goal will be assigned for patient and reassessed later if needed. Psychiatric rehabilitation team will engage with patient daily to provide therapeutic support throughout his inpatient stay.

## 2022-11-01 NOTE — BH INPATIENT PSYCHIATRY PROGRESS NOTE - NSBHFUPINTERVALHXFT_PSY_A_CORE
Chart reviewed. Case discussed with treatment team. Patient seen and examined. No acute overnight events, no PRNs required/requested. Refusing standing olanzapine.    Today, pt labile and easily irritable  No physical complaints. Endorses good appetite and sleep. Denies audtiory/visual hallucinations. Denies passive or active SI, intent, or plan.  Chart reviewed. Case discussed with treatment team. Patient seen and examined. No acute overnight events, no PRNs required/requested. Refusing standing olanzapine.    Today, pt labile and easily irritable, superficially cooperative with interview. Pt verbally abusive/aggressive towards treatment team. Increasingly thought disordered today. Interview limited by ability to present coherent narrative. Pt paranoid (prashant surrounding treatment mentioning family members), accusing team of looking at him suspiciously/having an attitude. Pt with spontaneous delusional content, refusing to take standing medication and vitamins stating "I have all the vitamins I need in my testicles. Unwilling to accept housing/shelter referrals. Pt requesting to speak with attending MD only. Unable to assess AVH, SI, HI.

## 2022-11-01 NOTE — PSYCHIATRIC REHAB INITIAL EVALUATION - NSBHALCSUBCHOICE_PSY_ALL_CORE
Per chart, patient has hx of using cannabis and crack cocaine. Also, chart indicates that patient has hx of opioid use disorder.

## 2022-11-01 NOTE — BH INPATIENT PSYCHIATRY PROGRESS NOTE - MSE UNSTRUCTURED FT
The patient appears older than stated age, poor hygiene and dressed inappropriately wrapped in Mandaen fashion in hospital gowns.  The patient was labile, easily irritable, cooperative with the interview and poor eye contact. Oddly related.  No psychomotor agitation or retardation noted, no abnormal movements.  Steady gait observed.  The patient's speech was fluent, normal in tone, rate, and loud volume.  The patient's mood is "good."  Affect is irritable, labile and not appropriate.  Thought process is mainly linear but can be disorganized at times, tangential. Thought content notable for paranoid delusional thought of persecution. No SI, HI. Unable to fully assess for hallucinations. Cognition grossly intact. Insight is poor.  Judgment is poor.  Impulse control has been fair on the unit. The patient appears older than stated age, poor hygiene and dressed appropriately in hospital gowns.  The patient was labile, easily irritable, superficially cooperative with the interview and poor eye contact. Oddly related.  No psychomotor agitation or retardation noted, no abnormal movements.  Steady gait observed.  The patient's speech was fluent, normal in tone, rate, and loud volume.  The patient's mood is "good."  Affect is irritable, labile and not appropriate.  Thought process is increasingly disorganized, tangential. Thought content notable for paranoid and delusional content. No SI, HI. Unable to fully assess for hallucinations. Cognition grossly intact. Insight is poor.  Judgment is poor.  Impulse control has been fair on the unit.

## 2022-11-02 PROCEDURE — 99238 HOSP IP/OBS DSCHRG MGMT 30/<: CPT

## 2022-11-02 RX ORDER — DIPHENHYDRAMINE HCL 50 MG
25 CAPSULE ORAL ONCE
Refills: 0 | Status: DISCONTINUED | OUTPATIENT
Start: 2022-11-02 | End: 2022-11-03

## 2022-11-02 RX ORDER — IBUPROFEN 200 MG
600 TABLET ORAL ONCE
Refills: 0 | Status: COMPLETED | OUTPATIENT
Start: 2022-11-02 | End: 2022-11-02

## 2022-11-02 RX ORDER — IBUPROFEN 200 MG
600 TABLET ORAL EVERY 6 HOURS
Refills: 0 | Status: DISCONTINUED | OUTPATIENT
Start: 2022-11-02 | End: 2022-11-03

## 2022-11-02 RX ORDER — HALOPERIDOL DECANOATE 100 MG/ML
7.5 INJECTION INTRAMUSCULAR ONCE
Refills: 0 | Status: DISCONTINUED | OUTPATIENT
Start: 2022-11-02 | End: 2022-11-03

## 2022-11-02 RX ADMIN — Medication 600 MILLIGRAM(S): at 16:17

## 2022-11-02 RX ADMIN — Medication 2 MILLIGRAM(S): at 22:50

## 2022-11-02 RX ADMIN — RISPERIDONE 2 MILLIGRAM(S): 4 TABLET ORAL at 22:53

## 2022-11-02 RX ADMIN — Medication 1 TABLET(S): at 08:33

## 2022-11-02 RX ADMIN — Medication 5 MILLIGRAM(S): at 22:50

## 2022-11-02 RX ADMIN — Medication 600 MILLIGRAM(S): at 21:38

## 2022-11-02 RX ADMIN — Medication 50 MILLIGRAM(S): at 08:34

## 2022-11-02 RX ADMIN — HALOPERIDOL DECANOATE 5 MILLIGRAM(S): 100 INJECTION INTRAMUSCULAR at 08:33

## 2022-11-02 RX ADMIN — HALOPERIDOL DECANOATE 5 MILLIGRAM(S): 100 INJECTION INTRAMUSCULAR at 22:50

## 2022-11-02 RX ADMIN — Medication 2 MILLIGRAM(S): at 08:34

## 2022-11-02 RX ADMIN — Medication 600 MILLIGRAM(S): at 22:32

## 2022-11-02 NOTE — BH INPATIENT PSYCHIATRY PROGRESS NOTE - MSE UNSTRUCTURED FT
On exam today the patient is labile but more cooperative.    Speech is clear and at times rapid.    Thought process: overinclusive but generally goal directed.    Thought content: with some ideas of reference .   Perception: Denies hearing voices or other perceptual disturbances   Mood: Describes as "OK".   Affect: flat.    Patient denies active suicidal ideation, intention and plan.    Patient denies active aggressive/homicidal ideation, intent or plan.   Patient is Alert and oriented .   Fund of knowledge is fair. Memory is intact  Insight and judgment are fair.   Impulse control is intact at this time.
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
## 2022-11-02 NOTE — BH INPATIENT PSYCHIATRY PROGRESS NOTE - NSBHFUPINTERVALHXFT_PSY_A_CORE
Patient seen for follow up  Chart reviewed and case discussed with treatment team.  Refusing standing risperidone  Patient was agitated earlier this morning but was able to calm down with PO PRN's  Does not want standing medications and asking for his discharge back to the community

## 2022-11-02 NOTE — BH DISCHARGE NOTE NURSING/SOCIAL WORK/PSYCH REHAB - NSDCPRRECOMMEND_PSY_ALL_CORE
Psychiatric Rehabilitation staff recommends that patient continues with outpatient treatment for ongoing support and psychotherapy. In addition to, continuing exploring and utilizing healthy coping strategies for improved symptom management and sustained recovery.

## 2022-11-02 NOTE — BH DISCHARGE NOTE NURSING/SOCIAL WORK/PSYCH REHAB - DISCHARGE INSTRUCTIONS AFTERCARE APPOINTMENTS
In order to check the location, date, or time of your aftercare appointment, please refer to your Discharge Instructions Document given to you upon leaving the hospital.  If you have lost the instructions please call [phone]

## 2022-11-02 NOTE — BH DISCHARGE NOTE NURSING/SOCIAL WORK/PSYCH REHAB - NSCDUDCCRISIS_PSY_A_CORE
[facility] Well  1 [phone] [facility]-WELL ([phone])  Text "WELL" to [phone]  Website: www.[employer]/.[employer] 1 (340) 621-IYUD (5525) Website: www.safehorizon.org/.National Suicide Prevention Lifeline 3 (717) 472-9309/.  [employer]  1 (785) LIFENET (884-5341)/.  [facility]  [address] [place] [phone]   Hours:  Monday through Friday from 9 AM to 3 PM/988 Suicide and Crisis Lifeline
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
## 2022-11-02 NOTE — BH INPATIENT PSYCHIATRY DISCHARGE NOTE - NSDCCPCAREPLAN_GEN_ALL_CORE_FT
PRINCIPAL DISCHARGE DIAGNOSIS  Diagnosis: Schizophrenia  Assessment and Plan of Treatment:       SECONDARY DISCHARGE DIAGNOSES  Diagnosis: Alcohol abuse  Assessment and Plan of Treatment:     Diagnosis: Cocaine abuse  Assessment and Plan of Treatment:     Diagnosis: Cannabis abuse  Assessment and Plan of Treatment:

## 2022-11-02 NOTE — BH TREATMENT PLAN - NSTXMEDICINTERPR_PSY_ALL_CORE
Over the next seven days, the psychiatric rehabilitation team will meet with patient to support transition to the hospital and utilize individual and group therapy to assist patient in reaching the established psych rehab goal until discharge.

## 2022-11-02 NOTE — BH DISCHARGE NOTE NURSING/SOCIAL WORK/PSYCH REHAB - NSDCPRGOAL_PSY_ALL_CORE
Pt is being discharged back to the community after being minimally engaged in treatment. Writer attempted to engage pt in safety planning, however pt declined to participate. Pt will be provided with safety resources in the Transitions of Care document to utilize after discharge. Pt denies any current SI/HI, intent, or plan. Pt also denies any current AH/VH or paranoia. Pt has been refusing standing medications and is focused on being discharged. Due to the COVID-19 pandemic, unit structure and activities are assessed on a daily basis to ensure the safety of pt’s and staff. During the current hospitalization, pt has been minimally receptive to skill development. Pt did not attend any psychiatric rehabilitation groups, although was visible on the unit and social with select peers. Pt presented as irritable, however, was able to maintain behavioral control. Pt is able to verbalize thoughts, needs, and feelings with encouragement. Pt will be provided with a Press Ganey survey to complete prior to discharge.

## 2022-11-02 NOTE — BH DISCHARGE NOTE NURSING/SOCIAL WORK/PSYCH REHAB - PATIENT PORTAL LINK FT
You can access the FollowMyHealth Patient Portal offered by [facility] by registering at the following website: [email or web address]. By joining [employer]’s FollowMyHealth portal, you will also be able to view your health information using other applications (apps) compatible with our system.

## 2022-11-03 VITALS — TEMPERATURE: 97 F

## 2022-11-03 RX ADMIN — Medication 1 TABLET(S): at 09:03

## 2022-11-03 RX ADMIN — Medication 600 MILLIGRAM(S): at 09:03

## 2022-11-03 NOTE — BH INPATIENT PSYCHIATRY PROGRESS NOTE - MSE UNSTRUCTURED FT
On exam today the patient is calm and  cooperative.    Speech is clear .    Thought process: goal directed.    Thought content: with no delusions .   Perception: Denies hearing voices or other perceptual disturbances   Mood: Describes as "very well".   Affect: flat.    Patient denies active suicidal ideation, intention and plan.    Patient denies active aggressive/homicidal ideation, intent or plan.   Patient is Alert and oriented .   Fund of knowledge is fair. Memory is intact  Insight and judgment are fair.   Impulse control is intact at this time.

## 2022-11-03 NOTE — BH INPATIENT PSYCHIATRY PROGRESS NOTE - CURRENT MEDICATION
MEDICATIONS  (STANDING):  multivitamin 1 Tablet(s) Oral daily  OLANZapine Disintegrating Tablet 10 milliGRAM(s) Oral at bedtime    MEDICATIONS  (PRN):  diphenhydrAMINE 50 milliGRAM(s) Oral every 4 hours PRN agitation  diphenhydrAMINE Injectable 25 milliGRAM(s) IntraMuscular once PRN severe agitation  haloperidol     Tablet 5 milliGRAM(s) Oral every 4 hours PRN agitation  haloperidol    Injectable 7.5 milliGRAM(s) IntraMuscular once PRN severe agitation  LORazepam     Tablet 2 milliGRAM(s) Oral every 4 hours PRN agitation  LORazepam   Injectable 3 milliGRAM(s) IntraMuscular once PRN severe agitation  melatonin. 5 milliGRAM(s) Oral at bedtime PRN Insomnia  
MEDICATIONS  (STANDING):  multivitamin 1 Tablet(s) Oral daily  risperiDONE  Disintegrating Tablet 2 milliGRAM(s) Oral at bedtime    MEDICATIONS  (PRN):  diphenhydrAMINE 50 milliGRAM(s) Oral every 4 hours PRN agitation  diphenhydrAMINE Injectable 25 milliGRAM(s) IntraMuscular once PRN severe agitation  diphenhydrAMINE Injectable 25 milliGRAM(s) IntraMuscular once PRN severe agitation  haloperidol     Tablet 5 milliGRAM(s) Oral every 4 hours PRN agitation  haloperidol    Injectable 7.5 milliGRAM(s) IntraMuscular once PRN severe agitation  haloperidol    Injectable 7.5 milliGRAM(s) IntraMuscular once PRN severe agitation  ibuprofen  Tablet. 600 milliGRAM(s) Oral every 6 hours PRN Temp greater or equal to 38C (100.4F), Mild Pain (1 - 3), Moderate Pain (4 - 6)  LORazepam     Tablet 2 milliGRAM(s) Oral every 4 hours PRN agitation  LORazepam   Injectable 3 milliGRAM(s) IntraMuscular once PRN severe agitation  LORazepam   Injectable 3 milliGRAM(s) IntraMuscular once PRN severe agitation  melatonin. 5 milliGRAM(s) Oral at bedtime PRN Insomnia  
MEDICATIONS  (STANDING):  multivitamin 1 Tablet(s) Oral daily  risperiDONE  Disintegrating Tablet 2 milliGRAM(s) Oral at bedtime    MEDICATIONS  (PRN):  diphenhydrAMINE 50 milliGRAM(s) Oral every 4 hours PRN agitation  diphenhydrAMINE Injectable 25 milliGRAM(s) IntraMuscular once PRN severe agitation  diphenhydrAMINE Injectable 25 milliGRAM(s) IntraMuscular once PRN severe agitation  haloperidol     Tablet 5 milliGRAM(s) Oral every 4 hours PRN agitation  haloperidol    Injectable 7.5 milliGRAM(s) IntraMuscular once PRN severe agitation  haloperidol    Injectable 7.5 milliGRAM(s) IntraMuscular once PRN severe agitation  ibuprofen  Tablet. 600 milliGRAM(s) Oral every 6 hours PRN Temp greater or equal to 38C (100.4F), Mild Pain (1 - 3), Moderate Pain (4 - 6)  LORazepam     Tablet 2 milliGRAM(s) Oral every 4 hours PRN agitation  LORazepam   Injectable 3 milliGRAM(s) IntraMuscular once PRN severe agitation  LORazepam   Injectable 3 milliGRAM(s) IntraMuscular once PRN severe agitation  melatonin. 5 milliGRAM(s) Oral at bedtime PRN Insomnia

## 2022-11-03 NOTE — BH INPATIENT PSYCHIATRY PROGRESS NOTE - NSCGISEVERILLNESS_PSY_ALL_CORE
7 = Among the most extremely ill patients – pathology drastically interferes in many life functions; may be hospitalized
7 = Among the most extremely ill patients – pathology drastically interferes in many life functions; may be hospitalized
4 = Moderately ill – overt symptoms causing noticeable, but modest, functional impairment or distress; symptom level may warrant medication

## 2022-11-03 NOTE — BH INPATIENT PSYCHIATRY PROGRESS NOTE - NSBHCHARTREVIEWVS_PSY_A_CORE FT
Vital Signs Last 24 Hrs  T(C): 36.3 (11-03-22 @ 06:10), Max: 36.4 (11-02-22 @ 17:36)  T(F): 97.3 (11-03-22 @ 06:10), Max: 97.5 (11-02-22 @ 17:36)  HR: --  BP: --  BP(mean): --  RR: --  SpO2: --    Orthostatic VS  11-03-22 @ 06:10  Lying BP: --/-- HR: --  Sitting BP: 121/82 HR: 74  Standing BP: 136/85 HR: 94  Site: --  Mode: --  Orthostatic VS  11-02-22 @ 19:47  Lying BP: --/-- HR: --  Sitting BP: 135/78 HR: 83  Standing BP: --/95 HR: 84  Site: --  Mode: --  Orthostatic VS  11-02-22 @ 06:49  Lying BP: --/-- HR: --  Sitting BP: 124/66 HR: 78  Standing BP: 127/85 HR: 97  Site: --  Mode: --  Orthostatic VS  11-01-22 @ 20:39  Lying BP: --/-- HR: --  Sitting BP: 135/85 HR: 74  Standing BP: 135/87 HR: 98  Site: --  Mode: --  
Vital Signs Last 24 Hrs  T(C): 36.7 (11-01-22 @ 08:43), Max: 36.7 (11-01-22 @ 08:43)  T(F): 98.1 (11-01-22 @ 08:43), Max: 98.1 (11-01-22 @ 08:43)  HR: --  BP: --  BP(mean): --  RR: --  SpO2: --    Orthostatic VS  11-01-22 @ 08:43  Lying BP: --/-- HR: --  Sitting BP: 125/88 HR: 92  Standing BP: 135/89 HR: 96  Site: --  Mode: --  Orthostatic VS  10-31-22 @ 16:43  Lying BP: --/-- HR: --  Sitting BP: 125/84 HR: 80  Standing BP: 120/80 HR: 80  Site: --  Mode: --  
Vital Signs Last 24 Hrs  T(C): 36.4 (11-02-22 @ 06:49), Max: 36.4 (11-02-22 @ 06:49)  T(F): 97.5 (11-02-22 @ 06:49), Max: 97.5 (11-02-22 @ 06:49)  HR: --  BP: --  BP(mean): --  RR: --  SpO2: --    Orthostatic VS  11-02-22 @ 06:49  Lying BP: --/-- HR: --  Sitting BP: 124/66 HR: 78  Standing BP: 127/85 HR: 97  Site: --  Mode: --  Orthostatic VS  11-01-22 @ 20:39  Lying BP: --/-- HR: --  Sitting BP: 135/85 HR: 74  Standing BP: 135/87 HR: 98  Site: --  Mode: --  Orthostatic VS  11-01-22 @ 08:43  Lying BP: --/-- HR: --  Sitting BP: 125/88 HR: 92  Standing BP: 135/89 HR: 96  Site: --  Mode: --  Orthostatic VS  10-31-22 @ 16:43  Lying BP: --/-- HR: --  Sitting BP: 125/84 HR: 80  Standing BP: 120/80 HR: 80  Site: --  Mode: --

## 2022-11-03 NOTE — BH INPATIENT PSYCHIATRY PROGRESS NOTE - NSBHMETABOLIC_PSY_ALL_CORE_FT
BMI: BMI (kg/m2): 20.3 (10-31-22 @ 16:43)  HbA1c:   Glucose:   BP: --  Lipid Panel: 

## 2022-11-03 NOTE — BH INPATIENT PSYCHIATRY PROGRESS NOTE - NSBHFUPINTERVALHXFT_PSY_A_CORE
Patient seen for follow up and discharge day management  Chart reviewed and case discussed with treatment team.  Patient calm and cooperative and ready for discharge

## 2022-11-03 NOTE — BH INPATIENT PSYCHIATRY PROGRESS NOTE - PRN MEDS
MEDICATIONS  (PRN):  diphenhydrAMINE 50 milliGRAM(s) Oral every 4 hours PRN agitation  diphenhydrAMINE Injectable 25 milliGRAM(s) IntraMuscular once PRN severe agitation  diphenhydrAMINE Injectable 25 milliGRAM(s) IntraMuscular once PRN severe agitation  haloperidol     Tablet 5 milliGRAM(s) Oral every 4 hours PRN agitation  haloperidol    Injectable 7.5 milliGRAM(s) IntraMuscular once PRN severe agitation  haloperidol    Injectable 7.5 milliGRAM(s) IntraMuscular once PRN severe agitation  ibuprofen  Tablet. 600 milliGRAM(s) Oral every 6 hours PRN Temp greater or equal to 38C (100.4F), Mild Pain (1 - 3), Moderate Pain (4 - 6)  LORazepam     Tablet 2 milliGRAM(s) Oral every 4 hours PRN agitation  LORazepam   Injectable 3 milliGRAM(s) IntraMuscular once PRN severe agitation  LORazepam   Injectable 3 milliGRAM(s) IntraMuscular once PRN severe agitation  melatonin. 5 milliGRAM(s) Oral at bedtime PRN Insomnia  
MEDICATIONS  (PRN):  diphenhydrAMINE 50 milliGRAM(s) Oral every 4 hours PRN agitation  diphenhydrAMINE Injectable 25 milliGRAM(s) IntraMuscular once PRN severe agitation  diphenhydrAMINE Injectable 25 milliGRAM(s) IntraMuscular once PRN severe agitation  haloperidol     Tablet 5 milliGRAM(s) Oral every 4 hours PRN agitation  haloperidol    Injectable 7.5 milliGRAM(s) IntraMuscular once PRN severe agitation  haloperidol    Injectable 7.5 milliGRAM(s) IntraMuscular once PRN severe agitation  ibuprofen  Tablet. 600 milliGRAM(s) Oral every 6 hours PRN Temp greater or equal to 38C (100.4F), Mild Pain (1 - 3), Moderate Pain (4 - 6)  LORazepam     Tablet 2 milliGRAM(s) Oral every 4 hours PRN agitation  LORazepam   Injectable 3 milliGRAM(s) IntraMuscular once PRN severe agitation  LORazepam   Injectable 3 milliGRAM(s) IntraMuscular once PRN severe agitation  melatonin. 5 milliGRAM(s) Oral at bedtime PRN Insomnia  
MEDICATIONS  (PRN):  diphenhydrAMINE 50 milliGRAM(s) Oral every 4 hours PRN agitation  diphenhydrAMINE Injectable 25 milliGRAM(s) IntraMuscular once PRN severe agitation  haloperidol     Tablet 5 milliGRAM(s) Oral every 4 hours PRN agitation  haloperidol    Injectable 7.5 milliGRAM(s) IntraMuscular once PRN severe agitation  LORazepam     Tablet 2 milliGRAM(s) Oral every 4 hours PRN agitation  LORazepam   Injectable 3 milliGRAM(s) IntraMuscular once PRN severe agitation  melatonin. 5 milliGRAM(s) Oral at bedtime PRN Insomnia

## 2022-11-03 NOTE — BH INPATIENT PSYCHIATRY PROGRESS NOTE - NSBHATTESTBILLINGAW_PSY_A_CORE
[id number] Inpatient care - moderate complexity - 25 minutes
Non-billable
[id number] Inpatient care - moderate complexity - 25 minutes

## 2022-11-05 ENCOUNTER — EMERGENCY (EMERGENCY)
Facility: HOSPITAL | Age: 57
LOS: 1 days | Discharge: HOME | End: 2022-11-05
Attending: EMERGENCY MEDICINE
Payer: MEDICAID

## 2022-11-05 VITALS
SYSTOLIC BLOOD PRESSURE: 120 MMHG | DIASTOLIC BLOOD PRESSURE: 80 MMHG | OXYGEN SATURATION: 97 % | RESPIRATION RATE: 18 BRPM | HEART RATE: 80 BPM

## 2022-11-05 VITALS
RESPIRATION RATE: 16 BRPM | HEART RATE: 85 BPM | SYSTOLIC BLOOD PRESSURE: 132 MMHG | TEMPERATURE: 98 F | DIASTOLIC BLOOD PRESSURE: 82 MMHG | OXYGEN SATURATION: 98 %

## 2022-11-05 DIAGNOSIS — F29 UNSPECIFIED PSYCHOSIS NOT DUE TO A SUBSTANCE OR KNOWN PHYSIOLOGICAL CONDITION: ICD-10-CM

## 2022-11-05 LAB
ALBUMIN SERPL ELPH-MCNC: 3.7 G/DL — SIGNIFICANT CHANGE UP (ref 3.3–5)
ALP SERPL-CCNC: 69 U/L — SIGNIFICANT CHANGE UP (ref 40–120)
ALT FLD-CCNC: 20 U/L — SIGNIFICANT CHANGE UP (ref 10–45)
AMPHET UR-MCNC: NEGATIVE — SIGNIFICANT CHANGE UP
ANION GAP SERPL CALC-SCNC: 10 MMOL/L — SIGNIFICANT CHANGE UP (ref 5–17)
APAP SERPL-MCNC: <15 UG/ML — SIGNIFICANT CHANGE UP (ref 10–30)
APPEARANCE UR: CLEAR — SIGNIFICANT CHANGE UP
AST SERPL-CCNC: 27 U/L — SIGNIFICANT CHANGE UP (ref 10–40)
BARBITURATES UR SCN-MCNC: NEGATIVE — SIGNIFICANT CHANGE UP
BASOPHILS # BLD AUTO: 0.04 K/UL — SIGNIFICANT CHANGE UP (ref 0–0.2)
BASOPHILS NFR BLD AUTO: 0.5 % — SIGNIFICANT CHANGE UP (ref 0–2)
BENZODIAZ UR-MCNC: NEGATIVE — SIGNIFICANT CHANGE UP
BILIRUB SERPL-MCNC: 0.3 MG/DL — SIGNIFICANT CHANGE UP (ref 0.2–1.2)
BILIRUB UR-MCNC: NEGATIVE — SIGNIFICANT CHANGE UP
BUN SERPL-MCNC: 18 MG/DL — SIGNIFICANT CHANGE UP (ref 7–23)
CALCIUM SERPL-MCNC: 8.9 MG/DL — SIGNIFICANT CHANGE UP (ref 8.4–10.5)
CHLORIDE SERPL-SCNC: 105 MMOL/L — SIGNIFICANT CHANGE UP (ref 96–108)
CO2 SERPL-SCNC: 25 MMOL/L — SIGNIFICANT CHANGE UP (ref 22–31)
COCAINE METAB.OTHER UR-MCNC: POSITIVE
COLOR SPEC: SIGNIFICANT CHANGE UP
CREAT SERPL-MCNC: 0.64 MG/DL — SIGNIFICANT CHANGE UP (ref 0.5–1.3)
DIFF PNL FLD: NEGATIVE — SIGNIFICANT CHANGE UP
EGFR: 112 ML/MIN/1.73M2 — SIGNIFICANT CHANGE UP
EOSINOPHIL # BLD AUTO: 0.44 K/UL — SIGNIFICANT CHANGE UP (ref 0–0.5)
EOSINOPHIL NFR BLD AUTO: 5 % — SIGNIFICANT CHANGE UP (ref 0–6)
ETHANOL SERPL-MCNC: <10 MG/DL — SIGNIFICANT CHANGE UP (ref 0–10)
GLUCOSE SERPL-MCNC: 82 MG/DL — SIGNIFICANT CHANGE UP (ref 70–99)
GLUCOSE UR QL: NEGATIVE — SIGNIFICANT CHANGE UP
HCT VFR BLD CALC: 35.8 % — LOW (ref 39–50)
HGB BLD-MCNC: 11.7 G/DL — LOW (ref 13–17)
IMM GRANULOCYTES NFR BLD AUTO: 0.5 % — SIGNIFICANT CHANGE UP (ref 0–0.9)
KETONES UR-MCNC: NEGATIVE — SIGNIFICANT CHANGE UP
LEUKOCYTE ESTERASE UR-ACNC: NEGATIVE — SIGNIFICANT CHANGE UP
LYMPHOCYTES # BLD AUTO: 2.29 K/UL — SIGNIFICANT CHANGE UP (ref 1–3.3)
LYMPHOCYTES # BLD AUTO: 26.1 % — SIGNIFICANT CHANGE UP (ref 13–44)
MCHC RBC-ENTMCNC: 27.5 PG — SIGNIFICANT CHANGE UP (ref 27–34)
MCHC RBC-ENTMCNC: 32.7 GM/DL — SIGNIFICANT CHANGE UP (ref 32–36)
MCV RBC AUTO: 84.2 FL — SIGNIFICANT CHANGE UP (ref 80–100)
METHADONE UR-MCNC: NEGATIVE — SIGNIFICANT CHANGE UP
MONOCYTES # BLD AUTO: 0.68 K/UL — SIGNIFICANT CHANGE UP (ref 0–0.9)
MONOCYTES NFR BLD AUTO: 7.7 % — SIGNIFICANT CHANGE UP (ref 2–14)
NEUTROPHILS # BLD AUTO: 5.29 K/UL — SIGNIFICANT CHANGE UP (ref 1.8–7.4)
NEUTROPHILS NFR BLD AUTO: 60.2 % — SIGNIFICANT CHANGE UP (ref 43–77)
NITRITE UR-MCNC: NEGATIVE — SIGNIFICANT CHANGE UP
NRBC # BLD: 0 /100 WBCS — SIGNIFICANT CHANGE UP (ref 0–0)
OPIATES UR-MCNC: NEGATIVE — SIGNIFICANT CHANGE UP
OXYCODONE UR-MCNC: NEGATIVE — SIGNIFICANT CHANGE UP
PCP SPEC-MCNC: SIGNIFICANT CHANGE UP
PCP UR-MCNC: NEGATIVE — SIGNIFICANT CHANGE UP
PH UR: 7.5 — SIGNIFICANT CHANGE UP (ref 5–8)
PLATELET # BLD AUTO: 248 K/UL — SIGNIFICANT CHANGE UP (ref 150–400)
POTASSIUM SERPL-MCNC: 4.1 MMOL/L — SIGNIFICANT CHANGE UP (ref 3.5–5.3)
POTASSIUM SERPL-SCNC: 4.1 MMOL/L — SIGNIFICANT CHANGE UP (ref 3.5–5.3)
PROT SERPL-MCNC: 7 G/DL — SIGNIFICANT CHANGE UP (ref 6–8.3)
PROT UR-MCNC: NEGATIVE — SIGNIFICANT CHANGE UP
RBC # BLD: 4.25 M/UL — SIGNIFICANT CHANGE UP (ref 4.2–5.8)
RBC # FLD: 14.9 % — HIGH (ref 10.3–14.5)
SALICYLATES SERPL-MCNC: <2 MG/DL — LOW (ref 15–30)
SARS-COV-2 RNA SPEC QL NAA+PROBE: SIGNIFICANT CHANGE UP
SODIUM SERPL-SCNC: 140 MMOL/L — SIGNIFICANT CHANGE UP (ref 135–145)
SP GR SPEC: 1.02 — SIGNIFICANT CHANGE UP (ref 1.01–1.02)
THC UR QL: POSITIVE
UROBILINOGEN FLD QL: NEGATIVE — SIGNIFICANT CHANGE UP
WBC # BLD: 8.78 K/UL — SIGNIFICANT CHANGE UP (ref 3.8–10.5)
WBC # FLD AUTO: 8.78 K/UL — SIGNIFICANT CHANGE UP (ref 3.8–10.5)

## 2022-11-05 PROCEDURE — 99285 EMERGENCY DEPT VISIT HI MDM: CPT

## 2022-11-05 PROCEDURE — 80053 COMPREHEN METABOLIC PANEL: CPT

## 2022-11-05 PROCEDURE — 81003 URINALYSIS AUTO W/O SCOPE: CPT

## 2022-11-05 PROCEDURE — 80307 DRUG TEST PRSMV CHEM ANLYZR: CPT

## 2022-11-05 PROCEDURE — 99212 OFFICE O/P EST SF 10 MIN: CPT

## 2022-11-05 PROCEDURE — 87635 SARS-COV-2 COVID-19 AMP PRB: CPT

## 2022-11-05 PROCEDURE — 85025 COMPLETE CBC W/AUTO DIFF WBC: CPT

## 2022-11-05 NOTE — ED BEHAVIORAL HEALTH ASSESSMENT NOTE - DIFFERENTIAL
unspecified psychosis, r/o substance intoxication, substance induced mood/psychotic disorder, schizophrenia, schizoaffective, malingering

## 2022-11-05 NOTE — ED BEHAVIORAL HEALTH ASSESSMENT NOTE - DETAILS
Discussed with ED attending Unable to assess [facility] admission 10/31-11/3/2022 for psychosis nonverbal Email sent to Dr. Alan Mendel[place] [employer]

## 2022-11-05 NOTE — ED PROVIDER NOTE - PATIENT PORTAL LINK FT
You can access the FollowMyHealth Patient Portal offered by [facility] by registering at the following website: [email or web address]. By joining [email or web address]’s FollowMyHealth portal, you will also be able to view your health information using other applications (apps) compatible with our system.

## 2022-11-05 NOTE — ED PROVIDER NOTE - OBJECTIVE STATEMENT
57 year old M with PMH schizophrenia, alcohol, cocaine and cannabis abuse BIBEMS in police custody for psych evaluation. Patient was recently discharged from psychiatric inpatient facility ([facility]) on 11/2. Patient was brought to police precinct for fingerprinting, which he refused, so police called EMS to bring him here for psych eval. Patient is calm and cooperative upon presentation. Complains only that he is hungry, has no current medical complaints at this time.

## 2022-11-05 NOTE — ED BEHAVIORAL HEALTH ASSESSMENT NOTE - OTHER
Unable to assess 911 activated by bystander ED attending,  did not assess [facility] psychosis ; paranoia, Unable to assess

## 2022-11-05 NOTE — ED BEHAVIORAL HEALTH NOTE - BEHAVIORAL HEALTH NOTE
Collateral obtained from most recent inpatient doctor Mendelowitz- states that patient has a psychotic illness but generally seems to function. States that he has to be treated with a tremendous amount of dignity and respect to get him to talk. Most recently on unit he was there for four days, took risperdal once, didn't require PRNs, when started talking was very bright, was discharged without meds as he doesn't usually take them though Dr. Mendelowitz notes that years ago he was on a different unit and did well on risperdal. Generally does not seem to be a danger but does come across as an emotionally disturbed person. Did not think inpatient hospitalization would benefit him but understands that other doctors may feel differently.

## 2022-11-05 NOTE — ED PROVIDER NOTE - PROGRESS NOTE DETAILS
Attending Monico:  pt would not participate in psych interview, they are requesting eval in ED by day team, pt remains hd stable non toxic Taco Hearn MD:  Patient assessed by psych, cleared from psych perspective, no primary medical etiology noted, stable for DC.

## 2022-11-05 NOTE — ED BEHAVIORAL HEALTH ASSESSMENT NOTE - DESCRIPTION
See BTCM note Unable to assess Unable to assess. 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

## 2022-11-05 NOTE — ED ADULT NURSE NOTE - OBJECTIVE STATEMENT
Pt is a 57 yr old male coming from an [facility] precinct for refusing to get fingerprinted. Pt was found by PD for putting graphite on the walls of a building and brought in. Pt was recently discharged from a psychiatric facility. Pt is calm and cooperative for nursing staff. Pt is under arrest.

## 2022-11-05 NOTE — ED BEHAVIORAL HEALTH ASSESSMENT NOTE - SUMMARY
57M, undomiciled, unknown PMH, per psykes psych hx of unspecified psychosis, polysubstance use (etoh, ccn, cannabis, opioids), multiple prior hospitalizations most recently [facility] from 10/31-11/3/2022 (previously [facility] in 2019), unknown suicide attempts or violence, now [employer] under arrest after her refused to give fingerprints.     Patient not speaking and not participating in interview. Per chart he has hx of psychotic disorder and polysubstance use. As such will give time to allow for metabolism of any substances and have patient reassessed

## 2022-11-05 NOTE — ED PROVIDER NOTE - PHYSICAL EXAMINATION
GENERAL: Awake, alert, NAD  LUNGS: CTAB, no wheezes or crackles   CARDIAC: RRR, no m/r/g  ABDOMEN: Soft, normal BS, non tender, non distended, no rebound, no guarding  NEURO: A&Ox3. Moving all extremities.  SKIN: Warm and dry. No rash.  PSYCH: Calm and cooperative, but non verbal. Indicating in sign he is hungry, and giving the finger to police officers. GENERAL: Awake, alert, NAD  LUNGS: CTAB, no wheezes or crackles   CARDIAC: RRR, no m/r/g  ABDOMEN: Soft, normal BS, non tender, non distended, no rebound, no guarding  NEURO: Alert, nonverbal, unable to answer orientation questions. Moving all extremities.  SKIN: Warm and dry. No rash.  PSYCH: Calm and cooperative, but non verbal. Indicating in sign he is hungry, and giving the finger to police officers.

## 2022-11-05 NOTE — ED PROVIDER NOTE - NSFOLLOWUPINSTRUCTIONS_ED_ALL_ED_FT
Please see a psychiatrist at [facility] for short-term psychiatric services and making a connection to long-term care, the hours are m-f 9am-3pm and the phone # is [phone]. The [facility] is located on the first floor of the [facility], within the campus of [facility]. The main entrance to our building is at the corner of [address] [place] [address] and [address] [place] [address] in [place], [place]. You can also access our campus through the hospital entrance at [phone] [place] [address]

## 2022-11-07 PROBLEM — Z78.9 OTHER SPECIFIED HEALTH STATUS: Chronic | Status: INACTIVE | Noted: 2019-09-14 | Resolved: 2022-11-05

## 2022-11-07 LAB
AMPHET UR-MCNC: NEGATIVE — SIGNIFICANT CHANGE UP
BARBITURATES, URINE.: NEGATIVE — SIGNIFICANT CHANGE UP
BENZODIAZ UR-MCNC: NEGATIVE — SIGNIFICANT CHANGE UP
BZE UR CFM-MCNC: 7260 NG/ML — SIGNIFICANT CHANGE UP
COCAINE METAB.OTHER UR-MCNC: SIGNIFICANT CHANGE UP
CREATININE, URINE THERAPEUTIC: 79.9 MG/DL — SIGNIFICANT CHANGE UP
METHADONE UR-MCNC: NEGATIVE — SIGNIFICANT CHANGE UP
METHAQUALONE UR QL: NEGATIVE — SIGNIFICANT CHANGE UP
METHAQUALONE UR-MCNC: NEGATIVE — SIGNIFICANT CHANGE UP
OPIATES UR-MCNC: NEGATIVE — SIGNIFICANT CHANGE UP
PCP UR-MCNC: NEGATIVE — SIGNIFICANT CHANGE UP
PROPOXYPH UR QL: NEGATIVE — SIGNIFICANT CHANGE UP
THC UR QL CFM: 1574 NG/ML — SIGNIFICANT CHANGE UP
THC UR QL: SIGNIFICANT CHANGE UP

## 2022-11-15 NOTE — ED BEHAVIORAL HEALTH ASSESSMENT NOTE - DIFFERENTIAL
Detail Level: Detailed
unspecified psychosis  r/o substance induced psychosis vs schizophrenia vs bipolar disorder with psychotic features

## 2023-07-02 NOTE — BH INPATIENT PSYCHIATRY ASSESSMENT NOTE - CURRENT PASSIVE IDEATION:
Poss bee sting to lefty upper arm.  Pt upper arm red, swollen, and painful.     Triage Assessment     Row Name 07/02/23 1310       Triage Assessment (Adult)    Airway WDL WDL       Respiratory WDL    Respiratory WDL WDL       Skin Circulation/Temperature WDL    Skin Circulation/Temperature WDL WDL       Cardiac WDL    Cardiac WDL WDL       Peripheral/Neurovascular WDL    Peripheral Neurovascular WDL WDL       Cognitive/Neuro/Behavioral WDL    Cognitive/Neuro/Behavioral WDL WDL              
Unable to assess

## 2023-09-27 NOTE — ED BEHAVIORAL HEALTH ASSESSMENT NOTE - HPI (INCLUDE ILLNESS QUALITY, SEVERITY, DURATION, TIMING, CONTEXT, MODIFYING FACTORS, ASSOCIATED SIGNS AND SYMPTOMS)
No
57M, undomiciled, unknown PMH, per psykes psych hx of unspecified psychosis, polysubstance use (etoh, ccn, cannabis, opioids), multiple prior hospitalizations most recently [facility] from 10/31-11/3/2022 (previously [facility] in 2019), unknown suicide attempts or violence, now [employer] under arrest after her refused to give fingerprints.     Patient did not participate in interview as he was not speaking to anyone, had to be woken initially but refused to talk and went back to sleep.     Accompanying officer stated that he was arrested for something, brought to precinct but refused to give finerprints and it is their policy that if someone refuses fingerprints they get taken to the hospital for an assessment. He said that this patient is known to them, generally undomiciled, at times he is known to be an emotional disturbed person, some days he talks to them other days he does not.

## 2025-04-07 NOTE — ED ADULT NURSE NOTE - TEMPLATE LIST FOR HEAD TO TOE ASSESSMENT
[FreeTextEntry1] : ECG reviewed today - NSR with PVC's, ST depressions. BP is controlled today. 2D echo in 2023 - normal EF 55-65%, grade I diastolic dysfunction. Mild aortic stenosis. Exam is consistent with moderate AS.  CT scan - coronary calcifications. c/w CAD  AS - repeat echo reviewed - AS progressed to at least moderate. Asymptomatic. Disease progression, AS symptoms discussed. If stable - repeat echo next visit, or immediately if any symptoms. BP control - low salt diet recommended, monitor BP, if elevated, add HCTZ Smoking cessation recommended. Coronary calcium c/w CAD. Start statin therapy. Repeat labs next visit Results of stress echo discussed  Patient was advised about healthy lifestyle changes, including diet and exercise. Importance of sustained long-term weight loss was discussed, questions answered.  F/u with hematology.   Return in 6 months
Psych/Behavioral